# Patient Record
Sex: FEMALE | Race: WHITE
[De-identification: names, ages, dates, MRNs, and addresses within clinical notes are randomized per-mention and may not be internally consistent; named-entity substitution may affect disease eponyms.]

---

## 2020-06-26 ENCOUNTER — HOSPITAL ENCOUNTER (EMERGENCY)
Dept: HOSPITAL 72 - EMR | Age: 30
Discharge: HOME | End: 2020-06-26
Payer: MEDICAID

## 2020-06-26 VITALS — SYSTOLIC BLOOD PRESSURE: 110 MMHG | DIASTOLIC BLOOD PRESSURE: 72 MMHG

## 2020-06-26 VITALS — WEIGHT: 110 LBS | HEIGHT: 65 IN | BODY MASS INDEX: 18.33 KG/M2

## 2020-06-26 VITALS — DIASTOLIC BLOOD PRESSURE: 72 MMHG | SYSTOLIC BLOOD PRESSURE: 110 MMHG

## 2020-06-26 VITALS — DIASTOLIC BLOOD PRESSURE: 71 MMHG | SYSTOLIC BLOOD PRESSURE: 106 MMHG

## 2020-06-26 DIAGNOSIS — N30.90: ICD-10-CM

## 2020-06-26 DIAGNOSIS — R10.13: Primary | ICD-10-CM

## 2020-06-26 LAB
ADD MANUAL DIFF: NO
ALBUMIN SERPL-MCNC: 4.5 G/DL (ref 3.4–5)
ALBUMIN/GLOB SERPL: 1.3 {RATIO} (ref 1–2.7)
ALP SERPL-CCNC: 44 U/L (ref 46–116)
ALT SERPL-CCNC: 30 U/L (ref 12–78)
ANION GAP SERPL CALC-SCNC: 10 MMOL/L (ref 5–15)
APPEARANCE UR: CLEAR
APTT BLD: 26 SEC (ref 23–33)
APTT PPP: (no result) S
AST SERPL-CCNC: 26 U/L (ref 15–37)
BASOPHILS NFR BLD AUTO: 1.7 % (ref 0–2)
BILIRUB SERPL-MCNC: 0.4 MG/DL (ref 0.2–1)
BUN SERPL-MCNC: 3 MG/DL (ref 7–18)
CALCIUM SERPL-MCNC: 9 MG/DL (ref 8.5–10.1)
CHLORIDE SERPL-SCNC: 104 MMOL/L (ref 98–107)
CO2 SERPL-SCNC: 27 MMOL/L (ref 21–32)
CREAT SERPL-MCNC: 0.7 MG/DL (ref 0.55–1.3)
EOSINOPHIL NFR BLD AUTO: 1.3 % (ref 0–3)
ERYTHROCYTE [DISTWIDTH] IN BLOOD BY AUTOMATED COUNT: 11.7 % (ref 11.6–14.8)
GLOBULIN SER-MCNC: 3.4 G/DL
GLUCOSE UR STRIP-MCNC: NEGATIVE MG/DL
HCT VFR BLD CALC: 42.2 % (ref 37–47)
HGB BLD-MCNC: 14 G/DL (ref 12–16)
INR PPP: 1 (ref 0.9–1.1)
KETONES UR QL STRIP: (no result)
LEUKOCYTE ESTERASE UR QL STRIP: NEGATIVE
LYMPHOCYTES NFR BLD AUTO: 43.5 % (ref 20–45)
MCV RBC AUTO: 95 FL (ref 80–99)
MONOCYTES NFR BLD AUTO: 5.4 % (ref 1–10)
NEUTROPHILS NFR BLD AUTO: 48.1 % (ref 45–75)
NITRITE UR QL STRIP: NEGATIVE
PH UR STRIP: 6.5 [PH] (ref 4.5–8)
PLATELET # BLD: 220 K/UL (ref 150–450)
POTASSIUM SERPL-SCNC: 3.8 MMOL/L (ref 3.5–5.1)
PROT UR QL STRIP: NEGATIVE
RBC # BLD AUTO: 4.46 M/UL (ref 4.2–5.4)
SODIUM SERPL-SCNC: 141 MMOL/L (ref 136–145)
SP GR UR STRIP: 1 (ref 1–1.03)
UROBILINOGEN UR-MCNC: NORMAL MG/DL (ref 0–1)
WBC # BLD AUTO: 6.4 K/UL (ref 4.8–10.8)

## 2020-06-26 PROCEDURE — 96372 THER/PROPH/DIAG INJ SC/IM: CPT

## 2020-06-26 PROCEDURE — 81003 URINALYSIS AUTO W/O SCOPE: CPT

## 2020-06-26 PROCEDURE — 93005 ELECTROCARDIOGRAM TRACING: CPT

## 2020-06-26 PROCEDURE — 80053 COMPREHEN METABOLIC PANEL: CPT

## 2020-06-26 PROCEDURE — 96374 THER/PROPH/DIAG INJ IV PUSH: CPT

## 2020-06-26 PROCEDURE — 74177 CT ABD & PELVIS W/CONTRAST: CPT

## 2020-06-26 PROCEDURE — 80307 DRUG TEST PRSMV CHEM ANLYZR: CPT

## 2020-06-26 PROCEDURE — 85025 COMPLETE CBC W/AUTO DIFF WBC: CPT

## 2020-06-26 PROCEDURE — 86900 BLOOD TYPING SEROLOGIC ABO: CPT

## 2020-06-26 PROCEDURE — 86901 BLOOD TYPING SEROLOGIC RH(D): CPT

## 2020-06-26 PROCEDURE — 83690 ASSAY OF LIPASE: CPT

## 2020-06-26 PROCEDURE — 81025 URINE PREGNANCY TEST: CPT

## 2020-06-26 PROCEDURE — 71045 X-RAY EXAM CHEST 1 VIEW: CPT

## 2020-06-26 PROCEDURE — 86850 RBC ANTIBODY SCREEN: CPT

## 2020-06-26 PROCEDURE — 36415 COLL VENOUS BLD VENIPUNCTURE: CPT

## 2020-06-26 PROCEDURE — 85610 PROTHROMBIN TIME: CPT

## 2020-06-26 PROCEDURE — 99284 EMERGENCY DEPT VISIT MOD MDM: CPT

## 2020-06-26 PROCEDURE — 84484 ASSAY OF TROPONIN QUANT: CPT

## 2020-06-26 PROCEDURE — 85730 THROMBOPLASTIN TIME PARTIAL: CPT

## 2020-06-26 PROCEDURE — 96375 TX/PRO/DX INJ NEW DRUG ADDON: CPT

## 2020-06-26 PROCEDURE — 96361 HYDRATE IV INFUSION ADD-ON: CPT

## 2020-06-26 NOTE — DIAGNOSTIC IMAGING REPORT
EXAM:

  CT Abdomen and Pelvis With Intravenous Contrast

 

CLINICAL HISTORY:

  ABD PAIN

 

TECHNIQUE:

  Axial computed tomography images of the abdomen and pelvis with 

intravenous contrast.  CTDI is 3.1 mGy and DLP is 138 mGy-cm.  One or 

more of the following dose reduction techniques were used: automated 

exposure control, adjustment of the mA and/or kV according to patient 

size, use of iterative reconstruction technique.

  Coronal and sagittal reformatted images were created and reviewed.

 

COMPARISON:

  No relevant prior studies available.

 

FINDINGS:

  Lung bases:  Unremarkable.  No mass.  No consolidation.

 

 ABDOMEN:

  Liver:  Unremarkable.  No mass.

  Gallbladder and bile ducts:  Unremarkable.  No calcified stones.  No 

ductal dilation.

  Pancreas:  Unremarkable.  No mass.  No ductal dilation.

  Spleen:  Unremarkable.  No splenomegaly.

  Adrenals:  Unremarkable.  No mass.

  Kidneys and ureters:  Unremarkable.  No solid mass.  No hydronephrosis.

  Stomach and bowel:  Unremarkable.  No obstruction.  No mucosal 

thickening.

 

 PELVIS:

  Appendix:  Appendix not definitively identified due to paucity of 

peritoneal fat and numerous fluid-filled bowel loops in the pelvis.  If 

there is continued concern, consider repeating the study with oral and IV 

contrast.

  Bladder:  Urinary bladder wall thickening, correlate to exclude 

infectious or inflammatory cystitis.

  Reproductive:  Unremarkable as visualized.

 

 ABDOMEN and PELVIS:

  Intraperitoneal space:  Unremarkable.  No free air.  No significant 

fluid collection.

  Bones/joints:  No acute fracture.  No dislocation.

  Soft tissues:  Unremarkable.

  Vasculature:  Unremarkable.  No abdominal aortic aneurysm.

  Lymph nodes:  Unremarkable.  No enlarged lymph nodes.

 

IMPRESSION:     

1.  Urinary bladder wall thickening, correlate to exclude infectious or 

inflammatory cystitis.

2.  Appendix not definitively identified due to paucity of peritoneal fat 

and numerous fluid-filled bowel loops in the pelvis.  If there is 

continued concern, consider repeating the study with oral and IV contrast.

 

3.  Otherwise no acute abnormality definitively identified to account for 

patient presentation.

## 2020-06-26 NOTE — NUR
ED Nurse Note:pt. came from home with c/o abdominaol pain, VSS, blood and urine 
sent to labs, given IV meds and fluids

## 2020-06-26 NOTE — DIAGNOSTIC IMAGING REPORT
EXAM:

  XR Chest, 1 View

 

CLINICAL HISTORY:

  ABD PAIN

 

TECHNIQUE:

  Frontal view of the chest.

 

COMPARISON:

  No relevant prior studies available.

 

FINDINGS:

  Lungs:  Unremarkable.  No consolidation.

  Pleural space:  Unremarkable.  No pneumothorax.

  Heart:  Unremarkable.  No cardiomegaly.

  Mediastinum:  Unremarkable.

  Bones/joints:  Unremarkable.

 

IMPRESSION:     

1.  No acute cardiopulmonary disease.

2.  If there is continued concern recommend frontal and lateral chest 

radiographs or CT.

## 2020-06-26 NOTE — EMERGENCY ROOM REPORT
History of Present Illness


General


Chief Complaint:  Abdominal Pain


Source:  Patient


 (Latricia Laurent)





Present Illness


HPI


29-year-old female with history of gastroparesis with a recent endoscopy done 2 

years ago here complaining of epigastric and right upper quadrant abdominal 

pain that started today rating a 5 out of 10 without radiation.  Complains of 

few bouts of nonbloody emesis.  Denies diarrhea and constipation.  Denies fever 

and chills.  Reports that the symptoms have been ongoing for several months 

now.  Patient has not established a new primary doctor and gastroenterologist.  

Patient called earlier today and asked if she should come in.  Patient denies 

any bloody emesis.  Reports that she has been losing 5 pounds in the past week 

and a half due to not being hungry.  Reports that she stop smoking marijuana 

about a week ago.  However was using it with tobacco every day for several 

months.  Denies any alcohol intake.  Denies all other drug use.  Sitting 

comfortably with stable vital signs.  Denies chest pain, shortness of breath, 

headache and dizziness.  Denies pregnancy.  Denies any urinary symptoms.  

Patient emphasizes that the pain is worse postprandial.


 (Latricia Laurent)


Allergies:  


Coded Allergies:  


     No Known Allergies (Unverified , 6/26/20)





COVID-19 Screening


Contact w/high risk pt:  No


Recent Travel to affected area:  No


Experienced COVID-19 symptoms?:  No


COVID-19 Testing performed PTA:  No


 (Latricia Laurent)





Patient History


Past Medical History:  see triage record


Past Surgical History:  none


Social History:  Reports: drug use - marijuana


Pregnant Now:  No


Immunizations:  UTD


Reviewed Nursing Documentation:  PMH: Agreed; PSxH: Agreed (Latricia Laurent)





Nursing Documentation-PMH


Hx Gastrointestinal Problems:  Yes - gastroparesis


 (Latricia Laurent)





Review of Systems


All Other Systems:  negative except mentioned in HPI


 (Latricia Laurent)





Physical Exam





Vital Signs








  Date Time  Temp Pulse Resp B/P (MAP) Pulse Ox O2 Delivery O2 Flow Rate FiO2


 


6/26/20 17:12 97.9 87 16 106/71 (83) 100 Room Air  








Sp02 EP Interpretation:  reviewed, normal


General Appearance:  no apparent distress, alert, GCS 15, non-toxic


Head:  normocephalic, atraumatic


Eyes:  bilateral eye normal inspection, bilateral eye PERRL


ENT:  hearing grossly normal, normal pharynx, no angioedema, normal voice


Neck:  full range of motion, supple, supple/symm/no masses


Respiratory:  normal inspection, chest non-tender, lungs clear, no rhonchi


Cardiovascular #1:  regular rate, rhythm, no edema


Gastrointestinal:  normal bowel sounds, non tender, soft, no mass, no 

organomegaly, no peritonitis, no bruit, non-distended, no guarding, no hernia, 

no pulsatile mass, no rebound


Rectal:  deferred


Genitourinary:  no CVA tenderness


Musculoskeletal:  back normal, normal range of motion, no calf tenderness


Neurologic:  alert, motor strength/tone normal, oriented x3, sensory intact, 

responsive, speech normal


Psychiatric:  judgement/insight normal, memory normal, mood/affect normal, no 

suicidal/homicidal ideation


Skin:  no rash


Lymphatic:  no adenopathy


 (Latricia Laurent)





Medical Decision Making


PA Attestation


All diagnoses and treatment plans were reviewed and discussed with my 

supervising physician Dr. Romero


 (Latricia Laurent)


Diagnostic Impression:  


 Primary Impression:  


 Abdominal pain


 Additional Impression:  


 Cystitis


ER Course


29-year-old female with history of gastroparesis with a recent endoscopy done 2 

years ago here complaining of epigastric and right upper quadrant abdominal 

pain that started today rating a 5 out of 10 without radiation.  Complains of 

few bouts of nonbloody emesis.  Denies diarrhea and constipation.  Denies fever 

and chills.  Reports that the symptoms have been ongoing for several months 

now.  Patient has not established a new primary doctor and gastroenterologist.  

Patient called earlier today and asked if she should come in.  Patient denies 

any bloody emesis.  Reports that she has been losing 5 pounds in the past week 

and a half due to not being hungry.  Reports that she stop smoking marijuana 

about a week ago.  However was using it with tobacco every day for several 

months.  Denies any alcohol intake.  Denies all other drug use.  Sitting 

comfortably with stable vital signs.  Denies chest pain, shortness of breath, 

headache and dizziness.  Denies pregnancy.  Denies any urinary symptoms.  

Patient emphasizes that the pain is worse postprandial.





Ddx considered but are not limited to: appendicitis, cholecystis, gastritis, 

gastroenteritis, UTI, pyelonephritis, SBO, diverticulitis, influenza with GI 

manifestation, MI, complication with pregnancy 














Vital signs: are WNL, pt. is afebrile








 H&PE are most consistent with: Gastritis, abdominal pain, cystitis most likely 

infectious














ORDERS: abdominal CT, abdominal pain set, EKG, chest x-ray, Zofran, omeprazole, 

Tylenol











ED INTERVENTIONS: NS bolus, Pepcid, Zofran, Toradol




















DISCHARGE: At this time pt. is stable for d/c to home. Will provide printed 

patient care instructions, and any necessary prescriptions. Care plan and 

follow up instructions have been discussed with the patient prior to discharge.

  Take medication as directed, follow with your primary care provider, you need 

to be seen by gastroenterologist for endoscopy and colonoscopy to rule out 

underlying GI diseases such as ulcerative colitis and Crohn's disease.  If 

worsening symptoms return to the emergency room


 (Latricia Laurent)





EKG Diagnostic Results


Rate:  normal


Rhythm:  NSR


ST Segments:  no acute changes


Other Impression


No acute ST changes


 (Latricia Laurent)





Chest X-Ray Diagnostic Results


Chest X-Ray Diagnostic Results :  


   Chest X-Ray Ordered:  Yes


   # of Views/Limited/Complete:  1 View


   Indication:  Other


   EP Interpretation:  Yes


   PA Xray:  Interpretation reviewed, by supervising MD, and agrees with 

findings.


   Interpretation:  no consolidation, no effusion, no pneumothorax


   Impression:  No acute disease


   Electronically Signed by:  Latricia Crowe PA-C


 (Latricia Laurent)


Chest X-Ray Diagnostic Results :  


   Electronically Signed by:  P A documentation of Xray reviewed by me and is 

accurate, Jose Alberto Romero MD


 (Jose Alberto Romero MD)





CT/MRI/US Diagnostic Results


CT/MRI/US Diagnostic Results :  


   Imaging Test Ordered:  CT abd pelvis with contrast


   Impression


COMPARISON:


No relevant prior studies available.





FINDINGS:


Lung bases: Unremarkable. No mass. No consolidation.





ABDOMEN:


Liver: Unremarkable. No mass.


Gallbladder and bile ducts: Unremarkable. No calcified stones. No ductal 

dilation.


Pancreas: Unremarkable. No mass. No ductal dilation.


Spleen: Unremarkable. No splenomegaly.


Adrenals: Unremarkable. No mass.


Kidneys and ureters: Unremarkable. No solid mass. No hydronephrosis.


Stomach and bowel: Unremarkable. No obstruction. No mucosal thickening.





PELVIS:


Appendix: Appendix not definitively identified due to paucity of peritoneal fat 

and numerous fluid-filled bowel loops in the pelvis. If there is continued 

concern, consider repeating the study with oral and IV contrast.


Bladder: Urinary bladder wall thickening, correlate to exclude infectious or 

inflammatory cystitis.


Reproductive: Unremarkable as visualized.





ABDOMEN and PELVIS:


Intraperitoneal space: Unremarkable. No free air. No significant fluid 

collection.


Bones/joints: No acute fracture. No dislocation.


Soft tissues: Unremarkable.


Vasculature: Unremarkable. No abdominal aortic aneurysm.


Lymph nodes: Unremarkable. No enlarged lymph nodes.





IMPRESSION:


1. Urinary bladder wall thickening, correlate to exclude infectious or 

inflammatory cystitis.


2. Appendix not definitively identified due to paucity of peritoneal fat and 

numerous fluid-filled bowel loops in the pelvis. If there is continued concern, 

consider repeating the study with oral and IV contrast.


3. Otherwise no acute abnormality definitively identified to account for 

patient presentation.


 (Latricia Laurent)





Last Vital Signs








  Date Time  Temp Pulse Resp B/P (MAP) Pulse Ox O2 Delivery O2 Flow Rate FiO2


 


6/26/20 18:06 97.9 67 16 110/72 100 Room Air  








 (Latricia Laurent)


Disposition:  HOME, SELF-CARE


Condition:  Stable


Scripts


Cephalexin* (KEFLEX*) 500 Mg Capsule


500 MG ORAL EVERY 12 HOURS for 7 Days, #14 CAP 0 Refills


   Prov: Latricia Laurent         6/26/20 


Omeprazole (Omeprazole) 20 Mg Tab.rap.dr


20 MG PO BID, #30 TAB


   Prov: Latricia Laurent         6/26/20 


Acetaminophen* (TYLENOL EXTRA STRENGTH*) 500 Mg Tablet


500 MG ORAL Q8H PRN for Prn Headache/Temp > 101, #30 TAB 0 Refills


   Prov: Latricia Laurent         6/26/20 


Ondansetron (Zofran) 4 Mg Tablet


4 MG ORAL Q6H PRN for Nausea & Vomiting, #20 TAB


   Prov: Latricia Laurent         6/26/20


Referrals:  


NON PHYSICIAN (PCP)


Patient Instructions:  Abdominal Pain, Adult, Urinary Tract Infection





Additional Instructions:  


Take medication as directed, follow-up with your primary care provider for 

referral to gastroenterologist for endoscopy and further evaluation.  If 

worsening symptoms return to the emergency room











Latricia Laurent Jun 26, 2020 18:57


Jose Alberto Romero MD Jun 28, 2020 03:20